# Patient Record
(demographics unavailable — no encounter records)

---

## 2025-01-07 NOTE — PHYSICAL EXAM
[Normal Thyroid] : the thyroid was normal [Normal Breath Sounds] : Normal breath sounds [Normal Heart Sounds] : normal heart sounds [Normal Rate and Rhythm] : normal rate and rhythm [2+] : left 2+ [No HSM] : no hepatosplenomegaly [No Rash or Lesion] : No rash or lesion [Alert] : alert [Calm] : calm [JVD] : no jugular venous distention  [Abdomen Masses] : No abdominal masses [Abdomen Tenderness] : ~T ~M No abdominal tenderness [de-identified] : Well-nourished, NAD [de-identified] : NC/AT [de-identified] : soft, +BS [de-identified] : FROM throughout, strength 5/5x4 PAST SURGICAL HISTORY:  History of surgery  X2,   APPENDECTOMY

## 2025-01-07 NOTE — PHYSICAL EXAM
[Normal Thyroid] : the thyroid was normal [Normal Breath Sounds] : Normal breath sounds [Normal Heart Sounds] : normal heart sounds [Normal Rate and Rhythm] : normal rate and rhythm [2+] : left 2+ [No HSM] : no hepatosplenomegaly [No Rash or Lesion] : No rash or lesion [Alert] : alert [Calm] : calm [JVD] : no jugular venous distention  [Abdomen Masses] : No abdominal masses [Abdomen Tenderness] : ~T ~M No abdominal tenderness [de-identified] : Well-nourished, NAD [de-identified] : NC/AT [de-identified] : soft, +BS [de-identified] : FROM throughout, strength 5/5x4

## 2025-01-07 NOTE — ASSESSMENT
[Aneurysm Surgery] : aneurysm surgery [FreeTextEntry1] : 76yoM Chinese-speaking, s/p EVAR/AAA 5/2/2018, returning for surveillance.  Currently, pt w/o any complaints at this time.  Reports no changes in medication regimen or medical history; he is ambulating well and exercising w/o limitation. Normal abdominal exam. Arterial duplex performed to assess for endograft patency/evidence of endoleak reveals widely patent endograft, sac measures 3.7x3.8cm, no appreciable endoleak noted.  Instructed pt to continue all meds and RTO in 1y for surveillance of the endograft.

## 2025-01-07 NOTE — HISTORY OF PRESENT ILLNESS
[FreeTextEntry1] : 76yoM Chinese-speaking, s/p EVAR/AAA  5/2/2018, returning for surveillance.  Currently, pt w/o any complaints at this time.  Reports no changes in medication regimen or medical history; he is ambulating well and exercising w/o limitation.

## 2025-01-07 NOTE — ADDENDUM
[FreeTextEntry1] :  This note was written by Sandra ZARAGOZA, acting as a scribe for Dr. Saturnino Fagan.  I, Dr. Saturnino Fagan, have read and attest that all the information, medical decision-making, and discharge instructions within are true and accurate.  I, Dr. Saturnino Fagan, personally performed the evaluation and management (E/M) services for this established patient who presents today with (an) existing condition(s).  That E/M includes conducting the examination, assessing all conditions, and (re)establishing/reinforcing a plan of care.  Today, my ACP, Sandra ZARAGOZA, was here to observe my evaluation and management services for this condition to be followed going forward.

## 2025-01-07 NOTE — PROCEDURE
[FreeTextEntry1] : Duplex performed to assess for endograft patency/evidence of endoleak reveals widely patent endograft, sac measures 3.7x3.8cm, no appreciable endoleak noted